# Patient Record
Sex: FEMALE | Race: WHITE | NOT HISPANIC OR LATINO
[De-identification: names, ages, dates, MRNs, and addresses within clinical notes are randomized per-mention and may not be internally consistent; named-entity substitution may affect disease eponyms.]

---

## 2017-02-16 ENCOUNTER — APPOINTMENT (OUTPATIENT)
Dept: UROLOGY | Facility: CLINIC | Age: 67
End: 2017-02-16

## 2017-02-16 VITALS — WEIGHT: 290 LBS | BODY MASS INDEX: 41.52 KG/M2 | TEMPERATURE: 98.2 F | HEIGHT: 70 IN | HEART RATE: 70 BPM

## 2017-02-16 DIAGNOSIS — E11.9 TYPE 2 DIABETES MELLITUS W/OUT COMPLICATIONS: ICD-10-CM

## 2017-02-16 DIAGNOSIS — R39.198 OTHER DIFFICULTIES WITH MICTURITION: ICD-10-CM

## 2017-02-27 ENCOUNTER — OUTPATIENT (OUTPATIENT)
Dept: OUTPATIENT SERVICES | Facility: HOSPITAL | Age: 67
LOS: 1 days | End: 2017-02-27

## 2017-03-17 ENCOUNTER — OUTPATIENT (OUTPATIENT)
Dept: OUTPATIENT SERVICES | Facility: HOSPITAL | Age: 67
LOS: 1 days | End: 2017-03-17

## 2017-03-17 ENCOUNTER — INPATIENT (INPATIENT)
Facility: HOSPITAL | Age: 67
LOS: 0 days | Discharge: HOME | End: 2017-03-18
Payer: MEDICARE

## 2017-03-17 PROCEDURE — 73503 X-RAY EXAM HIP UNI 4/> VIEWS: CPT | Mod: 26,LT

## 2017-03-18 ENCOUNTER — INPATIENT (INPATIENT)
Facility: HOSPITAL | Age: 67
LOS: 13 days | Discharge: HOME | End: 2017-04-01
Payer: MEDICARE

## 2017-03-18 ENCOUNTER — OUTPATIENT (OUTPATIENT)
Dept: OUTPATIENT SERVICES | Facility: HOSPITAL | Age: 67
LOS: 1 days | End: 2017-03-18

## 2017-03-21 PROCEDURE — 73503 X-RAY EXAM HIP UNI 4/> VIEWS: CPT | Mod: 26,LT

## 2017-03-23 PROCEDURE — 72170 X-RAY EXAM OF PELVIS: CPT | Mod: 26

## 2017-04-25 ENCOUNTER — APPOINTMENT (OUTPATIENT)
Dept: UROLOGY | Facility: CLINIC | Age: 67
End: 2017-04-25

## 2017-04-25 DIAGNOSIS — N36.42 INTRINSIC SPHINCTER DEFICIENCY (ISD): ICD-10-CM

## 2017-04-25 RX ORDER — LEVOTHYROXINE SODIUM 75 UG/1
75 TABLET ORAL
Qty: 90 | Refills: 0 | Status: ACTIVE | COMMUNITY
Start: 2017-03-08

## 2017-04-25 RX ORDER — NYSTATIN 100000 [USP'U]/G
100000 POWDER TOPICAL
Qty: 15 | Refills: 0 | Status: ACTIVE | COMMUNITY
Start: 2017-04-01

## 2017-04-25 RX ORDER — METFORMIN HYDROCHLORIDE 500 MG/1
500 TABLET, COATED ORAL
Qty: 360 | Refills: 0 | Status: ACTIVE | COMMUNITY
Start: 2017-03-27

## 2017-04-25 RX ORDER — LOVASTATIN 20 MG/1
20 TABLET ORAL
Qty: 90 | Refills: 0 | Status: ACTIVE | COMMUNITY
Start: 2017-03-08

## 2017-05-18 ENCOUNTER — APPOINTMENT (OUTPATIENT)
Dept: UROLOGY | Facility: CLINIC | Age: 67
End: 2017-05-18

## 2017-06-01 ENCOUNTER — APPOINTMENT (OUTPATIENT)
Dept: UROLOGY | Facility: CLINIC | Age: 67
End: 2017-06-01

## 2017-06-01 VITALS
HEIGHT: 70 IN | HEART RATE: 71 BPM | BODY MASS INDEX: 41.52 KG/M2 | WEIGHT: 290 LBS | RESPIRATION RATE: 13 BRPM | TEMPERATURE: 97.9 F

## 2017-06-01 RX ORDER — OXYCODONE 5 MG/1
5 TABLET ORAL
Qty: 30 | Refills: 0 | Status: DISCONTINUED | COMMUNITY
Start: 2017-04-01 | End: 2017-06-01

## 2017-06-01 RX ORDER — FERROUS FUM/VIT C/B12-IF/FOLIC 110-0.5MG
CAPSULE ORAL
Qty: 60 | Refills: 0 | Status: DISCONTINUED | COMMUNITY
Start: 2017-04-01 | End: 2017-06-01

## 2017-06-01 RX ORDER — CELECOXIB 200 MG/1
200 CAPSULE ORAL
Qty: 60 | Refills: 0 | Status: DISCONTINUED | COMMUNITY
Start: 2017-03-31 | End: 2017-06-01

## 2017-06-01 RX ORDER — SOLIFENACIN SUCCINATE 10 MG/1
10 TABLET, FILM COATED ORAL DAILY
Qty: 90 | Refills: 3 | Status: ACTIVE | COMMUNITY
Start: 2017-06-01 | End: 1900-01-01

## 2017-06-01 RX ORDER — AMOXICILLIN 500 MG/1
500 CAPSULE ORAL
Qty: 20 | Refills: 0 | Status: COMPLETED | COMMUNITY
Start: 2017-04-14 | End: 2017-06-01

## 2017-06-01 RX ORDER — PRAVASTATIN SODIUM 20 MG/1
20 TABLET ORAL
Qty: 30 | Refills: 0 | Status: DISCONTINUED | COMMUNITY
Start: 2017-04-01 | End: 2017-06-01

## 2017-06-01 RX ORDER — FAMOTIDINE 20 MG/1
20 TABLET, FILM COATED ORAL
Qty: 60 | Refills: 0 | Status: DISCONTINUED | COMMUNITY
Start: 2017-04-01 | End: 2017-06-01

## 2017-06-01 RX ORDER — OXYBUTYNIN CHLORIDE 10 MG/1
10 TABLET, EXTENDED RELEASE ORAL
Qty: 30 | Refills: 0 | Status: DISCONTINUED | COMMUNITY
Start: 2017-04-01 | End: 2017-06-01

## 2017-06-05 LAB
APPEARANCE: ABNORMAL
BACTERIA UR CULT: NORMAL
BACTERIA: ABNORMAL
BILIRUBIN URINE: NEGATIVE
BLOOD URINE: NEGATIVE
COLOR: ABNORMAL
GLUCOSE QUALITATIVE U: NORMAL MG/DL
HYALINE CASTS: 0 /LPF
KETONES URINE: NEGATIVE
LEUKOCYTE ESTERASE URINE: NEGATIVE
MICROSCOPIC-UA: NORMAL
NITRITE URINE: NEGATIVE
PH URINE: 8.5
PROTEIN URINE: NEGATIVE MG/DL
RED BLOOD CELLS URINE: 3 /HPF
SPECIFIC GRAVITY URINE: 1.02
SQUAMOUS EPITHELIAL CELLS: 5 /HPF
UROBILINOGEN URINE: NORMAL MG/DL
WHITE BLOOD CELLS URINE: 3 /HPF

## 2022-01-27 ENCOUNTER — NON-APPOINTMENT (OUTPATIENT)
Age: 72
End: 2022-01-27

## 2022-01-27 ENCOUNTER — APPOINTMENT (OUTPATIENT)
Dept: OPHTHALMOLOGY | Facility: CLINIC | Age: 72
End: 2022-01-27
Payer: MEDICARE

## 2022-01-27 PROCEDURE — 92133 CPTRZD OPH DX IMG PST SGM ON: CPT

## 2022-01-27 PROCEDURE — 92020 GONIOSCOPY: CPT

## 2022-01-27 PROCEDURE — 92014 COMPRE OPH EXAM EST PT 1/>: CPT

## 2022-01-31 ENCOUNTER — APPOINTMENT (OUTPATIENT)
Dept: ORTHOPEDIC SURGERY | Facility: CLINIC | Age: 72
End: 2022-01-31
Payer: MEDICARE

## 2022-02-07 ENCOUNTER — NON-APPOINTMENT (OUTPATIENT)
Age: 72
End: 2022-02-07

## 2022-02-07 ENCOUNTER — APPOINTMENT (OUTPATIENT)
Dept: OPHTHALMOLOGY | Facility: CLINIC | Age: 72
End: 2022-02-07
Payer: MEDICARE

## 2022-02-07 PROCEDURE — 92012 INTRM OPH EXAM EST PATIENT: CPT

## 2022-02-14 ENCOUNTER — APPOINTMENT (OUTPATIENT)
Dept: ORTHOPEDIC SURGERY | Facility: CLINIC | Age: 72
End: 2022-02-14
Payer: MEDICARE

## 2022-02-14 VITALS — TEMPERATURE: 97.6 F | WEIGHT: 290 LBS | BODY MASS INDEX: 41.52 KG/M2 | HEIGHT: 70 IN

## 2022-02-14 DIAGNOSIS — Z82.49 FAMILY HISTORY OF ISCHEMIC HEART DISEASE AND OTHER DISEASES OF THE CIRCULATORY SYSTEM: ICD-10-CM

## 2022-02-14 DIAGNOSIS — M18.12 UNILATERAL PRIMARY OSTEOARTHRITIS OF FIRST CARPOMETACARPAL JOINT, LEFT HAND: ICD-10-CM

## 2022-02-14 DIAGNOSIS — Z78.9 OTHER SPECIFIED HEALTH STATUS: ICD-10-CM

## 2022-02-14 DIAGNOSIS — M67.442 GANGLION, LEFT HAND: ICD-10-CM

## 2022-02-14 DIAGNOSIS — Z78.0 ASYMPTOMATIC MENOPAUSAL STATE: ICD-10-CM

## 2022-02-14 DIAGNOSIS — Z86.39 PERSONAL HISTORY OF OTHER ENDOCRINE, NUTRITIONAL AND METABOLIC DISEASE: ICD-10-CM

## 2022-02-14 DIAGNOSIS — Z84.1 FAMILY HISTORY OF DISORDERS OF KIDNEY AND URETER: ICD-10-CM

## 2022-02-14 DIAGNOSIS — Z83.3 FAMILY HISTORY OF DIABETES MELLITUS: ICD-10-CM

## 2022-02-14 DIAGNOSIS — M18.11 UNILATERAL PRIMARY OSTEOARTHRITIS OF FIRST CARPOMETACARPAL JOINT, RIGHT HAND: ICD-10-CM

## 2022-02-14 DIAGNOSIS — Z87.39 PERSONAL HISTORY OF OTHER DISEASES OF THE MUSCULOSKELETAL SYSTEM AND CONNECTIVE TISSUE: ICD-10-CM

## 2022-02-14 DIAGNOSIS — Z87.2 PERSONAL HISTORY OF DISEASES OF THE SKIN AND SUBCUTANEOUS TISSUE: ICD-10-CM

## 2022-02-14 PROCEDURE — 76881 US COMPL JOINT R-T W/IMG: CPT | Mod: LT

## 2022-02-14 PROCEDURE — 99204 OFFICE O/P NEW MOD 45 MIN: CPT | Mod: 25

## 2022-02-14 PROCEDURE — 73130 X-RAY EXAM OF HAND: CPT | Mod: 50

## 2022-02-14 RX ORDER — MUPIROCIN 20 MG/G
2 OINTMENT TOPICAL
Qty: 22 | Refills: 0 | Status: ACTIVE | COMMUNITY
Start: 2021-12-15

## 2022-02-14 RX ORDER — DESMOPRESSIN ACETATE 0.2 MG/1
0.2 TABLET ORAL
Qty: 90 | Refills: 0 | Status: ACTIVE | COMMUNITY
Start: 2021-12-13

## 2022-02-14 RX ORDER — TRIAMCINOLONE ACETONIDE 1 MG/G
0.1 CREAM TOPICAL
Qty: 80 | Refills: 0 | Status: ACTIVE | COMMUNITY
Start: 2021-12-17

## 2022-02-14 RX ORDER — ATORVASTATIN CALCIUM 20 MG/1
20 TABLET, FILM COATED ORAL
Qty: 90 | Refills: 0 | Status: ACTIVE | COMMUNITY
Start: 2021-12-15

## 2022-02-14 NOTE — PHYSICAL EXAM
[de-identified] : - Constitutional: This is a female in no obvious distress.  \par - Psych: Patient is alert and oriented to person, place and time.  Patient has a normal mood and affect.\par - Cardiovascular: Normal pulses throughout the upper extremities.  No significant varicosities are noted in the upper extremities. \par - Neuro: Strength and sensation are intact throughout the upper extremities.  Patient has normal coordination.\par - Respiratory:  Patient exhibits no evidence of shortness of breath or difficulty breathing.\par - Skin: No rashes, lesions, or other abnormalities are noted in the upper extremities.\par \par ---\par \par Examination both thumbs demonstrate swelling and tenderness along the CMC joints consistent with CMC joint arthritis.  There is no evidence of de Quervain's tendinitis or trigger thumb.  She is neurovascular intact distally.\par \par Examination of her left middle finger demonstrates a small palpable mass along the radial aspect of the DIP joint.  There is some tenderness.  This appears either consistent with a ganglion cyst emanating from the DIP joint radially or possibly other benign etiologies.  She has full motion.  She is neurovascularly intact distally. [de-identified] : AP, lateral, and oblique radiographs of the bilateral hands demonstrate bilateral CMC joint arthritis of the thumbs.  There is no evidence of arthritis or abnormalities at the left middle finger DIP joint.\par \par A diagnostic ultrasound of her left middle finger demonstrates a possible cyst or other benign lesion overlying the DIP joint radially.

## 2022-02-14 NOTE — HISTORY OF PRESENT ILLNESS
[Right] : right hand dominant [FreeTextEntry1] : She comes in today for evaluation of bilateral thumb pain for several years and left middle finger for 2 months. With regard to the left middle finger, she states that pain was sudden. She rates her pain a 8 out of 10 when she either bumps or uses her finger. With regard to the bilateral thumb, She states that the pain had a gradual onset. She thinks that it maybe arthritis. She states that she has no history of injury or trauma for either hands or fingers. She denies having imaging and testing done.\par \par She has a past medical history which includes type 2 diabetes.\par \par She used to see Dr. Garcia. She was not referred.

## 2022-02-14 NOTE — END OF VISIT
[FreeTextEntry3] : This note was written by Je Hansen on 02/14/2022 acting solely as a scribe for Dr. Jose Hunt.\par  \par All medical record entries made by the Scribe were at my, Dr. Jose Hunt, direction and personally dictated by me on 02/14/2022. I have personally reviewed the chart and agree that the record accurately reflects my personal performance of the history, physical exam.

## 2022-02-14 NOTE — DISCUSSION/SUMMARY
[FreeTextEntry1] : She has findings consistent with a probable left middle finger DIP joint ganglion cyst which is causing her mild symptoms as well as chronic mild bilateral thumb pain secondary to CMC joint arthritis.\par \par I had a discussion with the patient regarding today's visit, the prognosis of this diagnosis, and treatment recommendations and options.\par \par With regard to the left middle finger probable cyst, as her symptoms are relatively mild, I have recommended observation.  I explained to her that this is not amenable to aspiration, as it is overlying the neurovascular bundle.  The only option would be surgical excision.  At this time she has agreed to proceed with observation.  If it increases in size or she develops worsening symptoms, then she will follow-up to discuss surgical excision.\par \par With regard to the bilateral thumb pain, as her symptoms are relatively mild, she deferred a cortisone injection today.\par \par The patient has agreed to the above plan of management and has expressed full understanding.  All questions were fully answered to the patient's satisfaction. \par \par My cumulative time spent on this visit included: Preparation for the visit, review of the medical records, review of pertinent diagnostic studies, examination and counseling of the patient on the above diagnosis, treatment plan and prognosis, orders of diagnostic tests, medication and/or appropriate procedures and documentation in the medical records of today's visit.

## 2022-10-27 ENCOUNTER — APPOINTMENT (OUTPATIENT)
Dept: OPHTHALMOLOGY | Facility: CLINIC | Age: 72
End: 2022-10-27

## 2022-10-27 ENCOUNTER — NON-APPOINTMENT (OUTPATIENT)
Age: 72
End: 2022-10-27

## 2022-10-27 PROCEDURE — 92014 COMPRE OPH EXAM EST PT 1/>: CPT

## 2022-10-27 PROCEDURE — 92020 GONIOSCOPY: CPT

## 2022-10-27 PROCEDURE — 92133 CPTRZD OPH DX IMG PST SGM ON: CPT

## 2022-12-06 ENCOUNTER — APPOINTMENT (OUTPATIENT)
Dept: UROGYNECOLOGY | Facility: CLINIC | Age: 72
End: 2022-12-06

## 2022-12-06 VITALS
SYSTOLIC BLOOD PRESSURE: 179 MMHG | BODY MASS INDEX: 40.09 KG/M2 | WEIGHT: 280 LBS | DIASTOLIC BLOOD PRESSURE: 90 MMHG | HEIGHT: 70 IN

## 2022-12-06 PROCEDURE — 99204 OFFICE O/P NEW MOD 45 MIN: CPT

## 2022-12-06 NOTE — HISTORY OF PRESENT ILLNESS
[FreeTextEntry1] : Patient is a 72-year-old G0 who presents today for evaluation and management of urinary frequency urgency and incontinence\par Patient states that I am tired of getting up 3-4 times at night for past 15-18 years. She thinks its getting worse. She states that the daytime is not as bad but still gets sudden strong urge to void and starts leaking urine. She goes through 6 poise pad per day. She is concerned about the cost of these products. \par She leaks rarely with cough/ vomit\par Daily fluid intake: 2 cups of coffee, 1 glass of ice tea, 20 oz of soda, some water\par Patient saw Dr. Sarai Zamarripa in 2016 and urogynecologist Dr. Serrano before that.  She had urodynamic testing with Dr. Zamarripa in February 2017 which showed normal sensation, normal cystometric capacity of 500 cc, absence of detrusor overactivity and absence of stress urinary incontinence.  There was no evidence of voiding dysfunction. Patient had coapt tight injected in April 2017 for ISD.  She has tried Vesicare.   She is currently taking 10 mg of Vesicare.She is also taking Desmopressin per Dr. Zamarripa\par Vaginal symptoms: denies vaginal pain, not \par \par GYN Hx: LMP: around age 57, denies hx of postmenopausal bleeding.  She reports having a Pap smear done few weeks ago by Dr. Blair.  She denies history of abnormal mammograms\par \par Past medical history: Diabetes (reports recent hemoglobin A1c of 5.7), hyperlipidemia, hypothyroidism, obesity \par \par Past surgical history: Hip replacement

## 2022-12-06 NOTE — ASSESSMENT
[FreeTextEntry1] : Patient is a 72-year-old nulligravida presenting with long history nocturia, overactive bladder, of with mixed urinary incontinence (urgency greater than stress). On examination, there is no evidence of urethral hypermobility with a negative empty bladder supine cough stress test, normal postvoid residual, and clear levator ani awareness/contraction. She has no history of recurrent UTI. She admits to consumption of large volumes of bladder irritants. Other potential contributing factors include with obesity, diabetes and decreased mobility.  She is taking Vesicare 10 mg as well as desmopressin and does not report satisfactory improvement.  She has previously seen Dr. Zamarripa in 2016 and had urethral bulking done.  Her urodynamic testing from 2016 was negative for evidence of detrusor overactivity or stress urinary incontinence.\par \par

## 2022-12-06 NOTE — PHYSICAL EXAM
[Chaperone Present] : A chaperone was present in the examining room during all aspects of the physical examination [FreeTextEntry1] : General: Not in acute distress, alert and oriented x3.\par Neck: Supple. No lymphadenopathy. \par Abdomen: Soft, nontender, and nondistended. No obvious hepatosplenomegaly. No obvious hernias. \par Pelvic Exam: Normal external female genitalia. Saddle sensory exam S2 to S4 is intact. Perineal reflexes not visualized. Urethra is well supported without prolapse, exudates, or lesions. Cough stress test is negative. Post void residual was checked with I/O cath and was 40 cc of clear urine. Pale and mildly atrophic-appearing vaginal epithelium. No vaginal blood or discharge. Cervix without abnormal lesions. Bimanual exam reveals a small uterus in normal positioning. No palpable adnexal masses or tenderness. Levator ani contraction is 2/5.  All vaginal compartments are well supported\par \par

## 2022-12-06 NOTE — DISCUSSION/SUMMARY
[FreeTextEntry1] : The patient was counseled regarding the pathophysiology of the above conditions. A total of approximately 60 minutes was spent on this visit, greater than 50%of which was spent on counseling. She was also counseled regarding the risks, benefits, indications, and alternatives of further evaluations studies, as well as various management options. She was given verbal and written information/education on pelvic floor muscle exercises, avoidance of dietary bladder irritants, and other strategies to improve bladder control.  management of overactive bladder and urgency incontinence including medications, behavioral modification, physical therapy, Botox and sacral neuromodulation were discussed. After a detailed discussion, following management plan was outlined:\par 1.  Patient is consuming large volumes of bladder irritants.  Recommended 12 weeks trial of behavioral modification, bladder retraining and pelvic floor muscle therapy. she will start the home exercise program as instructed.\par 2. UA with micro and urine culture was ordered to exclude UTI.\par 3.  She has multiple risk factors for sleep apnea she endorses daytime somnolence and fatigue as well as poor quality of sleep.  I strongly recommended screening for sleep apnea.  She agrees.  Referral for sleep medicine clinic was provided.  Night time fluid restriction and other strategies to decrease nocturia were reviewed.\par 4. Discussed importance of maintaining optimal glycemic control in for bladder health.\par 5.  I also reviewed tapering herself off the desmopressin if its not helping.  She is going to contact her endocrinologist who is also her primary care provider regarding discontinuation of desmopressin. \par 6.  F/u in 2 to 3 months.  If she hasn't felt satisfactory improvement with above, will consider further evaluation with urodynamic testing. .

## 2022-12-07 LAB
APPEARANCE: CLEAR
BACTERIA: NEGATIVE
BILIRUBIN URINE: NEGATIVE
BLOOD URINE: NEGATIVE
COLOR: NORMAL
GLUCOSE QUALITATIVE U: NEGATIVE
HYALINE CASTS: 1 /LPF
KETONES URINE: NEGATIVE
LEUKOCYTE ESTERASE URINE: NEGATIVE
MICROSCOPIC-UA: NORMAL
NITRITE URINE: NEGATIVE
PH URINE: 6.5
PROTEIN URINE: NEGATIVE
RED BLOOD CELLS URINE: 1 /HPF
SPECIFIC GRAVITY URINE: 1.01
SQUAMOUS EPITHELIAL CELLS: 1 /HPF
UROBILINOGEN URINE: NORMAL
WHITE BLOOD CELLS URINE: 0 /HPF

## 2022-12-08 LAB — BACTERIA UR CULT: NORMAL

## 2023-02-17 ENCOUNTER — APPOINTMENT (OUTPATIENT)
Dept: UROGYNECOLOGY | Facility: CLINIC | Age: 73
End: 2023-02-17

## 2023-12-06 ENCOUNTER — RESULT CHARGE (OUTPATIENT)
Age: 73
End: 2023-12-06

## 2023-12-07 ENCOUNTER — APPOINTMENT (OUTPATIENT)
Dept: UROGYNECOLOGY | Facility: CLINIC | Age: 73
End: 2023-12-07
Payer: MEDICARE

## 2023-12-07 DIAGNOSIS — R06.83 SNORING: ICD-10-CM

## 2023-12-07 PROCEDURE — 81003 URINALYSIS AUTO W/O SCOPE: CPT | Mod: QW

## 2023-12-07 PROCEDURE — 99214 OFFICE O/P EST MOD 30 MIN: CPT

## 2023-12-07 RX ORDER — FLUCONAZOLE 150 MG/1
150 TABLET ORAL
Qty: 3 | Refills: 0 | Status: ACTIVE | COMMUNITY
Start: 2023-12-07 | End: 1900-01-01

## 2023-12-07 RX ORDER — SULFAMETHOXAZOLE AND TRIMETHOPRIM 800; 160 MG/1; MG/1
800-160 TABLET ORAL
Qty: 28 | Refills: 0 | Status: ACTIVE | COMMUNITY
Start: 2023-12-07 | End: 1900-01-01

## 2023-12-07 RX ORDER — SOLIFENACIN SUCCINATE 10 MG/1
10 TABLET ORAL
Qty: 90 | Refills: 1 | Status: ACTIVE | COMMUNITY
Start: 1900-01-01 | End: 1900-01-01

## 2023-12-07 RX ORDER — METRONIDAZOLE 500 MG/1
500 TABLET ORAL
Qty: 14 | Refills: 0 | Status: ACTIVE | COMMUNITY
Start: 2023-12-07 | End: 1900-01-01

## 2023-12-18 RX ORDER — AMOXICILLIN AND CLAVULANATE POTASSIUM 875; 125 MG/1; MG/1
875-125 TABLET, COATED ORAL TWICE DAILY
Qty: 14 | Refills: 0 | Status: ACTIVE | COMMUNITY
Start: 2023-12-18 | End: 1900-01-01

## 2023-12-18 RX ORDER — DOXYCYCLINE HYCLATE 100 MG/1
100 TABLET ORAL
Qty: 14 | Refills: 0 | Status: ACTIVE | COMMUNITY
Start: 2023-12-18 | End: 1900-01-01

## 2023-12-21 ENCOUNTER — APPOINTMENT (OUTPATIENT)
Dept: UROGYNECOLOGY | Facility: CLINIC | Age: 73
End: 2023-12-21
Payer: MEDICARE

## 2023-12-21 PROCEDURE — 99214 OFFICE O/P EST MOD 30 MIN: CPT

## 2023-12-27 ENCOUNTER — APPOINTMENT (OUTPATIENT)
Dept: UROGYNECOLOGY | Facility: CLINIC | Age: 73
End: 2023-12-27
Payer: MEDICARE

## 2023-12-27 PROCEDURE — 51741 ELECTRO-UROFLOWMETRY FIRST: CPT

## 2023-12-27 PROCEDURE — 51784 ANAL/URINARY MUSCLE STUDY: CPT

## 2023-12-27 PROCEDURE — 51797 INTRAABDOMINAL PRESSURE TEST: CPT

## 2023-12-27 PROCEDURE — 81003 URINALYSIS AUTO W/O SCOPE: CPT | Mod: QW

## 2023-12-27 PROCEDURE — 51729 CYSTOMETROGRAM W/VP&UP: CPT

## 2023-12-29 LAB
APPEARANCE: CLEAR
BACTERIA: NEGATIVE /HPF
BILIRUBIN URINE: NEGATIVE
BLOOD URINE: NEGATIVE
CAST: 1 /LPF
COLOR: YELLOW
EPITHELIAL CELLS: 1 /HPF
GLUCOSE QUALITATIVE U: NEGATIVE MG/DL
KETONES URINE: NEGATIVE MG/DL
LEUKOCYTE ESTERASE URINE: NEGATIVE
MICROSCOPIC-UA: NORMAL
NITRITE URINE: NEGATIVE
PH URINE: 6.5
PROTEIN URINE: NEGATIVE MG/DL
RED BLOOD CELLS URINE: 0 /HPF
SPECIFIC GRAVITY URINE: 1.02
UROBILINOGEN URINE: 0.2 MG/DL
WHITE BLOOD CELLS URINE: 0 /HPF

## 2024-01-01 NOTE — HISTORY OF PRESENT ILLNESS
[FreeTextEntry1] : Patient is a 72-year-old nulligravida with obesity and suspected sleep apnea, who is presenting with long history nocturia, overactive bladder,  mixed urinary incontinence (urgency greater than stress) and cellulitis of the upper media right thigh.  She has tried Vesicare with suboptimal improvement in her symptoms.  Patient presented for urodynamic testing on 12/27/2023.  Her urodynamic test findings are consistent with an interrupted uroflow with voided volume of 370 cc, postvoid residual of 40 cc, max flow rate of 31, average flow rate of 12, and flow time of 31 seconds, her complex cystogram showed normal sensation, normal compliance, normal cystometric capacity of 488 cc, absence of detrusor overactivity during filling and presence of stress urinary incontinence starting at filled volume of 200 cc with Valsalva and with cough starting at filled volume of 300 cc; she voided 560 cc for pressure voiding study with max flow rate of 34, average flow rate of 12, flow time of 46 seconds, detrusor pressure at peak flow of 13 cm of water, normal continuous configuration and Valsalva appearing to be the mechanism of void.

## 2024-01-01 NOTE — DISCUSSION/SUMMARY
[FreeTextEntry1] : I discussed the urodynamic procedure finding in detail.  I again discussed the diagnosis of overactive bladder, urgency incontinence as well as stress incontinence and explained to her that she has a combination of these conditions and treatment for one may not address the other condition. She was again counseled regarding treatment options for stress urinary incontinence including pelvic floor PT, pessary placement and surgical management with midurethral sling. AUGS interactive tool was used to explain normal anatomy as well as alteration in urethral support associated with stress urinary incontinence. We also reviewed treatment options for urinary frequency and nocturia including bladder retraining, Pharmacologic management, Botox etc. We discussed addressing OAB symptoms first vs doing sling procedure for stress incontinence first. After a detailed discussion, patient strongly believes that she wants to address her stress incontinence first and wants to proceed with sling placement. She is comfortable with the possibly of requiring additional treatment for her OAB symptoms and urgency incontinence if needed. She understands that the midurethral sling is meant to address her stress type urinary incontinence and may not improve urgency incontinence. I discussed the potential risks and complications associated with midurethral sling procedure including changes in urinary stream including weak stream, need to spend additional time emptying bladder, voiding dysfunction, temporary bladder catheterization, recurrent urinary tract infection, mesh related complication, pelvic pain/ dyspareunia, injury to bladder/ urethra and need for future surgery etc. I also reviewed her undocumented history of frequent UTI and potential worsening of UTI frequency following sling procedure. She verbalized understanding. I provided her written information regarding the midurethral sling procedure.

## 2024-01-01 NOTE — ASSESSMENT
[FreeTextEntry1] :  Above urodynamic findings are consistent with normal sensation, normal compliance, normal cystometric capacity, absence of detrusor overactivity, presence of stress urinary incontinence and absence of voiding dysfunction.  Patient appears to be a reasonable candidate for retropubic mid mid urethral sling placement from urodynamic criteria however she has multiple medical comorbidities which need to be optimized prior to proceeding with the surgical procedure.  Her cellulitis of the right thigh is improving.

## 2024-01-02 LAB — BACTERIA UR CULT: NORMAL

## 2024-01-04 ENCOUNTER — APPOINTMENT (OUTPATIENT)
Dept: OPHTHALMOLOGY | Facility: CLINIC | Age: 74
End: 2024-01-04
Payer: MEDICARE

## 2024-01-04 ENCOUNTER — NON-APPOINTMENT (OUTPATIENT)
Age: 74
End: 2024-01-04

## 2024-01-04 PROCEDURE — 92133 CPTRZD OPH DX IMG PST SGM ON: CPT

## 2024-01-04 PROCEDURE — 92020 GONIOSCOPY: CPT

## 2024-01-04 PROCEDURE — 92014 COMPRE OPH EXAM EST PT 1/>: CPT

## 2024-01-06 NOTE — DISCUSSION/SUMMARY
[FreeTextEntry1] : [] Recommend finishing Doxycycline 100mg PO BID X7day and Augmentin PO BID x7days. [] Pt is scheduled for UDS tests on 12/27/23, advised her to discontinue Vesicare until she is done with test. Provided her UDS Brochure for education and gave her instructions to not to empty her bladder before test. Pt verbalized understanding. [] Recommend using Zinc Oxide in groin area for protection of skin from breakage.  [] Follow up with endocrinologist for better control of diabetes and weight loss. [] Follow up with sleep medicine doctor to get CPAP machine as soon as possible. [] Recommend seeing gynecologist and going for US of breast and mammogram. [] Contact [facility] after UDs testing if feel ready for Axonics procedure. [] Follow up after UDS test. [] Instructed to call with any questions or concerns and she verbalizes understanding..

## 2024-01-06 NOTE — ADDENDUM
[FreeTextEntry1] : I, Dr. Amanda Tatum, was physically present in the office during the service provided to this patient.  I reviewed the history of presenting illness, chief complaint, management plan with physician assistant Vee Beltran before it was communicated to the patient.  I have personally examined the patient and evaluated the and the status of her groin abscess.  Overall she is improving.  I agree with the assessment and plan as documented by PA

## 2024-01-06 NOTE — ASSESSMENT
[FreeTextEntry1] : Patient is a 72-year-old nulligravida presenting with long history nocturia, overactive bladder, of with mixed urinary incontinence (urgency greater than stress). Pt is currently taking antibiotic for groin cellulitis with improvement. Her OAB symptoms are not controlled with Vesicare 10mg and she wants to proceed with Axonics procedure however she noticed a lump in her breast a week ago and wants to take care of it first. Pt has scheduled appointment for UDS on 12/27/23.

## 2024-01-06 NOTE — PHYSICAL EXAM
[FreeTextEntry1] : General: Not in acute distress, alert and oriented x3 Abdomen: Soft, nontender, and nondistended. Ecchymosis due to recent heparin injections. Pelvic Exam: Superomedial thigh cellulitis is all healed. Deep lump is palpable that will resolve with time. No erythema or discharge noted.

## 2024-01-06 NOTE — HISTORY OF PRESENT ILLNESS
[FreeTextEntry1] : Patient is a 72-year-old nulligravida presenting with long history nocturia, overactive bladder, and mixed urinary incontinence (urgency greater than stress). Pt was started on Bactrim, Metronidazole and Fluconazole on 12/07/23 for cellulitis of superomedial compartment of thigh. Pt called on 12/18/23 for having allergic reaction to Bactrim so her antibiotics were changed to Doxycycline and Augmentin x7day. Pt notes improvement in cellulitis, no pain and swelling any more. Pt reports currently taking both antibiotics without any complaint. Pt reports noticing new lump in her breast/sternum area that she is really worried and going to see gynecologist today.  Pt reports taking Vesicare 10mg with minimal improvement and reports waking up 3-4 times at night and wears pads all the time. Pt reports seeing her sleep medicine doctor next week for CPAP machine. Pt reports [facility] contacted her for SNM procedure, but she was not in right state of mind due to her breast lump and told her that she will contact her once done taking care of lump. Pt reports she is coming for UDS testing this coming Wednesday. Pt complained of being tired and not able to sleep all night.

## 2024-01-12 ENCOUNTER — APPOINTMENT (OUTPATIENT)
Dept: UROGYNECOLOGY | Facility: CLINIC | Age: 74
End: 2024-01-12
Payer: MEDICARE

## 2024-01-12 ENCOUNTER — NON-APPOINTMENT (OUTPATIENT)
Age: 74
End: 2024-01-12

## 2024-01-12 VITALS
DIASTOLIC BLOOD PRESSURE: 92 MMHG | SYSTOLIC BLOOD PRESSURE: 169 MMHG | WEIGHT: 280 LBS | BODY MASS INDEX: 40.09 KG/M2 | HEIGHT: 70 IN

## 2024-01-12 DIAGNOSIS — L03.315 CELLULITIS OF PERINEUM: ICD-10-CM

## 2024-01-12 PROCEDURE — 99214 OFFICE O/P EST MOD 30 MIN: CPT

## 2024-01-12 NOTE — ASSESSMENT
[FreeTextEntry1] : Above the above test findings are consistent with normal sensation, normal compliance, normal cystometric capacity, absence of detrusor overactivity, presence of stress urinary incontinence and absence of voiding dysfunction. Patient appears to be a reasonable candidate for retropubic mid mid urethral sling placement from urodynamic criteria however she has multiple medical comorbidities which need to be optimized prior to proceeding with the surgical procedure.

## 2024-01-12 NOTE — DISCUSSION/SUMMARY
[FreeTextEntry1] : I discussed the urodynamic test findings in detail with the patient.  She has a stopped using Vesicare 5 days before the test.  She mostly describes urgency symptoms however her most bothersome symptom is nocturia which is likely related to sleep apnea.  She is seeing the sleep apnea physician and is waiting for the CPAP machine to arrive..  I discussed the findings of stress urinary incontinence during urodynamic testing discussed management options for stress urinary incontinence including pessary and retropubic mid urethral sling placement.  The efficacy of the sling procedure, outpatient nature of the procedure and potential complications were reviewed with the patient.  After detailed discussion, she would like to start with a trial of incontinence ring with support pessary.  She will return in 2 to 3 weeks for pessary fitting. I again discussed the importance of weight loss.  Her hemoglobin A1c is well-controlled.  She is hesitant to consider Ozempic due to due to potential side effects.  I encouraged her to consider a consultation with dietitian to come up with individualized nutrition plan.  She is motivated to do so. Urine culture sent today to exclude urinary tract infection.  If there is evidence of urinary tract infection, will recommend antibiotic such as Keflex 500 mg p.o. twice daily for 7 days Continue Vesicare 10 mg p.o. daily.  Advised her to take the medication in the evening.

## 2024-01-12 NOTE — PHYSICAL EXAM
[FreeTextEntry1] : General: Not in acute distress, alert and oriented x3. Pelvic Exam: Normal external female genitalia.  Previously noted cellulitis of the right thigh has completely resolved.  No skin erythema, no induration etc. noted.  She has a small skin tag on right thigh which she was worried about and had dermatologist look at it. [No Acute Distress] : in no acute distress [Well developed] : well developed [Oriented x3] : oriented to person, place, and time

## 2024-01-16 LAB
APPEARANCE: ABNORMAL
BACTERIA: NEGATIVE /HPF
BILIRUBIN URINE: NEGATIVE
BLOOD URINE: NEGATIVE
CALCIUM OXALATE CRYSTALS: PRESENT
CAST: 0 /LPF
COLOR: YELLOW
EPITHELIAL CELLS: 3 /HPF
GLUCOSE QUALITATIVE U: NEGATIVE MG/DL
KETONES URINE: NEGATIVE MG/DL
LEUKOCYTE ESTERASE URINE: NEGATIVE
MICROSCOPIC-UA: NORMAL
NITRITE URINE: NEGATIVE
PH URINE: 6.5
PROTEIN URINE: NEGATIVE MG/DL
RED BLOOD CELLS URINE: NORMAL /HPF
REVIEW: NORMAL
SPECIFIC GRAVITY URINE: 1.02
UROBILINOGEN URINE: 0.2 MG/DL
WHITE BLOOD CELLS URINE: 0 /HPF

## 2024-01-17 LAB — BACTERIA UR CULT: NORMAL

## 2024-01-25 ENCOUNTER — APPOINTMENT (OUTPATIENT)
Dept: UROGYNECOLOGY | Facility: CLINIC | Age: 74
End: 2024-01-25

## 2024-02-15 ENCOUNTER — APPOINTMENT (OUTPATIENT)
Dept: UROGYNECOLOGY | Facility: CLINIC | Age: 74
End: 2024-02-15
Payer: MEDICARE

## 2024-02-15 PROCEDURE — 57160 INSERT PESSARY/OTHER DEVICE: CPT

## 2024-02-15 PROCEDURE — A4562: CPT

## 2024-02-17 LAB — BACTERIA UR CULT: NORMAL

## 2024-02-27 ENCOUNTER — APPOINTMENT (OUTPATIENT)
Dept: UROGYNECOLOGY | Facility: CLINIC | Age: 74
End: 2024-02-27
Payer: MEDICARE

## 2024-02-27 PROCEDURE — 99213 OFFICE O/P EST LOW 20 MIN: CPT

## 2024-02-28 LAB
APPEARANCE: ABNORMAL
BACTERIA: NEGATIVE /HPF
BILIRUBIN URINE: NEGATIVE
BLOOD URINE: NEGATIVE
CAST: 0 /LPF
COLOR: YELLOW
EPITHELIAL CELLS: 7 /HPF
GLUCOSE QUALITATIVE U: NEGATIVE MG/DL
KETONES URINE: NEGATIVE MG/DL
LEUKOCYTE ESTERASE URINE: ABNORMAL
MICROSCOPIC-UA: NORMAL
NITRITE URINE: NEGATIVE
PH URINE: 6.5
PROTEIN URINE: NEGATIVE MG/DL
RED BLOOD CELLS URINE: 5 /HPF
REVIEW: NORMAL
SPECIFIC GRAVITY URINE: 1.01
TRANSITIONAL EPITHELIAL CELLS: PRESENT
UROBILINOGEN URINE: 0.2 MG/DL
WHITE BLOOD CELLS URINE: 36 /HPF

## 2024-02-29 ENCOUNTER — NON-APPOINTMENT (OUTPATIENT)
Age: 74
End: 2024-02-29

## 2024-02-29 LAB — BACTERIA UR CULT: NORMAL

## 2024-04-19 ENCOUNTER — APPOINTMENT (OUTPATIENT)
Dept: UROGYNECOLOGY | Facility: CLINIC | Age: 74
End: 2024-04-19
Payer: MEDICARE

## 2024-04-19 DIAGNOSIS — R35.1 NOCTURIA: ICD-10-CM

## 2024-04-19 DIAGNOSIS — B37.89 OTHER SITES OF CANDIDIASIS: ICD-10-CM

## 2024-04-19 PROCEDURE — 81003 URINALYSIS AUTO W/O SCOPE: CPT | Mod: QW

## 2024-04-19 PROCEDURE — 64561 IMPLANT NEUROELECTRODES: CPT | Mod: 50,59

## 2024-04-19 PROCEDURE — 99214 OFFICE O/P EST MOD 30 MIN: CPT | Mod: 25

## 2024-04-19 RX ORDER — FLUCONAZOLE 200 MG/1
200 TABLET ORAL
Qty: 3 | Refills: 0 | Status: ACTIVE | COMMUNITY
Start: 2024-04-19 | End: 1900-01-01

## 2024-04-22 LAB
APPEARANCE: CLEAR
BACTERIA UR CULT: NORMAL
BACTERIA: NEGATIVE /HPF
BILIRUBIN URINE: NEGATIVE
BLOOD URINE: NEGATIVE
CAST: 2 /LPF
COLOR: YELLOW
EPITHELIAL CELLS: 4 /HPF
GLUCOSE QUALITATIVE U: NEGATIVE MG/DL
KETONES URINE: NEGATIVE MG/DL
LEUKOCYTE ESTERASE URINE: NEGATIVE
MICROSCOPIC-UA: NORMAL
NITRITE URINE: NEGATIVE
PH URINE: 6
PROTEIN URINE: 30 MG/DL
RED BLOOD CELLS URINE: 1 /HPF
SPECIFIC GRAVITY URINE: 1.02
UROBILINOGEN URINE: 0.2 MG/DL
WHITE BLOOD CELLS URINE: 1 /HPF

## 2024-04-25 ENCOUNTER — APPOINTMENT (OUTPATIENT)
Dept: UROGYNECOLOGY | Facility: CLINIC | Age: 74
End: 2024-04-25
Payer: MEDICARE

## 2024-04-25 DIAGNOSIS — Z46.89 ENCOUNTER FOR FITTING AND ADJUSTMENT OF OTHER SPECIFIED DEVICES: ICD-10-CM

## 2024-04-25 DIAGNOSIS — R35.0 FREQUENCY OF MICTURITION: ICD-10-CM

## 2024-04-25 DIAGNOSIS — R39.15 URGENCY OF URINATION: ICD-10-CM

## 2024-04-25 DIAGNOSIS — N39.3 STRESS INCONTINENCE (FEMALE) (MALE): ICD-10-CM

## 2024-04-25 DIAGNOSIS — N89.8 OTHER SPECIFIED NONINFLAMMATORY DISORDERS OF VAGINA: ICD-10-CM

## 2024-04-25 DIAGNOSIS — N39.46 MIXED INCONTINENCE: ICD-10-CM

## 2024-04-25 DIAGNOSIS — R32 UNSPECIFIED URINARY INCONTINENCE: ICD-10-CM

## 2024-04-25 DIAGNOSIS — N39.41 URGE INCONTINENCE: ICD-10-CM

## 2024-04-25 DIAGNOSIS — N32.81 OVERACTIVE BLADDER: ICD-10-CM

## 2024-04-25 PROCEDURE — 99024 POSTOP FOLLOW-UP VISIT: CPT

## 2024-04-25 NOTE — PHYSICAL EXAM
[FreeTextEntry1] :  General: Not in acute distress, alert and oriented x3.Neck: Supple. No lymphadenopathy.  Abdomen: Soft, nontender, and nondistended. No obvious hepatosplenomegaly. No obvious hernias. Successfully back PNE leads were removed with colored marker tip. No evidence of lead breaking or fragments left behind.  Pelvic Exam: Normal external female genitalia. Urethra with a small polyp, its hypermobile without prolapse, exudates, or lesions. Cough stress test is negative. Anterior vaginal wall is well supported. Pessary was removed. No prolapse noted with and without pessary. At the right vaginal wall small laceration was noted w/o bleeding. Vaginal fourchette and top of urethral opening some laceration noted due to removal of pessary. Very scant bleeding.

## 2024-04-25 NOTE — ASSESSMENT
[FreeTextEntry1] : Anna after PNE noted great difference in her OAB symptoms however improvement lasted only one day. Still pt is agreeable to do advance trial. Also, pt's pessary was removed due to noticing no difference in SUZY and pain with removal and insertion of pessary.

## 2024-04-25 NOTE — HISTORY OF PRESENT ILLNESS
[FreeTextEntry1] : Patient is a 73-year-old nulligravida with obesity and suspected sleep apnea, who is presenting with long history nocturia, overactive bladder, and mixed urinary incontinence (urgency greater than stress) Pt was here for the leads removal. Pt had a PNE in the office on 04/19/24. Pt is very excited to share that she didn't notice any frequency and urgency the same day she had PNE. Reports, at night during sleep her leads might dislodged and symptoms started to appear next day. Reports noticing no difference in her symptoms after first day. She is interested in proceeding with full trial.   Pt also wants her pessary to be removed today. Pt reports she did not notice any difference in urine incontinence with pessary. Denies any pain with pessary/pressure and vaginal bleeding. Pt reports she cannot tolerate pain while removal and insertion every three months for pessary care. Denies urinary/UTI symptoms, constipation etc

## 2024-04-25 NOTE — DISCUSSION/SUMMARY
[FreeTextEntry1] : [] PNE leads are successfully removed with colored markers attached at the tip. No bleeding at the site and erythema noted.  [] Considering, pt noticed difference only same day, we offered her advance trail before full implant to make sure pt is best candidate for this procedure. Pt is agreeable to this plan. Message sent to Yesica to call pt to schedule for advance trial with full implant date.  [] Pessary Rs#2 with knob was removed in the office. Pt doesn't want to continue with pessary.  [] Voided urine sample is sent to the lab for UA and culture to rule out UTI.  If she has evidence of urinary tract infection, will consider treating with nitrofurantoin 100 mg p.o. twice daily for 7 days or Keflex 500 mg p.o. twice daily for 7 days. [] Follow up as needed. [] Instructed to call with any questions or concerns and she verbalizes understanding.

## 2024-04-25 NOTE — ADDENDUM
[FreeTextEntry1] : Patient seen and examined with DICK Beltran.  Patient had excellent response to PNA within the first 12 to 20 hours.  She had no urgency no leakage however however during sleep she felt that she might have dislodged the PNE leads .  Her nocturia was not improved.  In addition she has known sleep apnea with some struggle with the CPAP mask.  She has not noticed any improvement in her bladder leakage with use of pessary.  I discussed the options of advanced trial of Axonics therapy.  She agrees.  I sent over booking sheet.  She will book the procedure when it is logistically feasible for her.  The two-step nature of the process of placement of neurostimulator, potential risks etc. were reviewed.  She is on a very weight loss program.  She has so far lost 35 pounds with use of Mounjaro 2.5 mg.  Losing weight is expected to help her with urine incontinence as well as with her sleep apnea.  She verbalized understanding.  I have submitted over booking sheet

## 2024-04-26 LAB
APPEARANCE: CLEAR
BACTERIA: NEGATIVE /HPF
BILIRUBIN URINE: NEGATIVE
BLOOD URINE: NEGATIVE
CAST: 0 /LPF
COLOR: YELLOW
EPITHELIAL CELLS: 0 /HPF
GLUCOSE QUALITATIVE U: NEGATIVE MG/DL
KETONES URINE: NEGATIVE MG/DL
LEUKOCYTE ESTERASE URINE: ABNORMAL
MICROSCOPIC-UA: NORMAL
NITRITE URINE: NEGATIVE
PH URINE: 7.5
PROTEIN URINE: NEGATIVE MG/DL
RED BLOOD CELLS URINE: 4 /HPF
SPECIFIC GRAVITY URINE: 1.01
UROBILINOGEN URINE: 0.2 MG/DL
WHITE BLOOD CELLS URINE: 0 /HPF

## 2024-04-29 RX ORDER — AMOXICILLIN AND CLAVULANATE POTASSIUM 875; 125 MG/1; MG/1
875-125 TABLET, COATED ORAL TWICE DAILY
Qty: 14 | Refills: 0 | Status: ACTIVE | COMMUNITY
Start: 2024-04-29 | End: 1900-01-01

## 2024-06-18 ENCOUNTER — APPOINTMENT (OUTPATIENT)
Dept: UROGYNECOLOGY | Facility: HOSPITAL | Age: 74
End: 2024-06-18

## 2024-06-25 ENCOUNTER — NON-APPOINTMENT (OUTPATIENT)
Age: 74
End: 2024-06-25

## 2024-06-25 ENCOUNTER — APPOINTMENT (OUTPATIENT)
Dept: OPHTHALMOLOGY | Facility: CLINIC | Age: 74
End: 2024-06-25
Payer: COMMERCIAL

## 2024-06-25 PROCEDURE — 92004 COMPRE OPH EXAM NEW PT 1/>: CPT

## 2024-07-05 ENCOUNTER — APPOINTMENT (OUTPATIENT)
Dept: UROGYNECOLOGY | Facility: HOSPITAL | Age: 74
End: 2024-07-05

## 2024-07-12 ENCOUNTER — APPOINTMENT (OUTPATIENT)
Dept: UROGYNECOLOGY | Facility: CLINIC | Age: 74
End: 2024-07-12

## 2024-10-18 ENCOUNTER — APPOINTMENT (OUTPATIENT)
Dept: OPHTHALMOLOGY | Facility: CLINIC | Age: 74
End: 2024-10-18
Payer: MEDICARE

## 2024-10-18 ENCOUNTER — NON-APPOINTMENT (OUTPATIENT)
Age: 74
End: 2024-10-18

## 2024-10-18 PROCEDURE — 92014 COMPRE OPH EXAM EST PT 1/>: CPT

## 2024-10-18 PROCEDURE — 92133 CPTRZD OPH DX IMG PST SGM ON: CPT

## 2025-03-27 ENCOUNTER — APPOINTMENT (OUTPATIENT)
Dept: UROGYNECOLOGY | Facility: CLINIC | Age: 75
End: 2025-03-27
Payer: MEDICARE

## 2025-03-27 DIAGNOSIS — R35.0 FREQUENCY OF MICTURITION: ICD-10-CM

## 2025-03-27 DIAGNOSIS — N39.46 MIXED INCONTINENCE: ICD-10-CM

## 2025-03-27 DIAGNOSIS — R35.1 NOCTURIA: ICD-10-CM

## 2025-03-27 DIAGNOSIS — N32.81 OVERACTIVE BLADDER: ICD-10-CM

## 2025-03-27 PROCEDURE — 99213 OFFICE O/P EST LOW 20 MIN: CPT

## 2025-03-27 RX ORDER — SOLIFENACIN SUCCINATE 10 MG/1
10 TABLET ORAL
Qty: 90 | Refills: 1 | Status: ACTIVE | COMMUNITY
Start: 2025-03-27 | End: 1900-01-01

## 2025-03-28 LAB
APPEARANCE: CLEAR
BACTERIA: NEGATIVE /HPF
BILIRUBIN URINE: NEGATIVE
BLOOD URINE: NEGATIVE
CAST: 0 /LPF
COLOR: NORMAL
EPITHELIAL CELLS: 1 /HPF
GLUCOSE QUALITATIVE U: NEGATIVE MG/DL
KETONES URINE: NEGATIVE MG/DL
LEUKOCYTE ESTERASE URINE: NEGATIVE
MICROSCOPIC-UA: NORMAL
NITRITE URINE: NEGATIVE
PH URINE: 7.5
PROTEIN URINE: NEGATIVE MG/DL
RED BLOOD CELLS URINE: 0 /HPF
SPECIFIC GRAVITY URINE: 1.01
UROBILINOGEN URINE: 0.2 MG/DL
WHITE BLOOD CELLS URINE: 0 /HPF

## 2025-04-01 RX ORDER — CEFPODOXIME PROXETIL 200 MG/1
200 TABLET, FILM COATED ORAL
Qty: 14 | Refills: 0 | Status: ACTIVE | COMMUNITY
Start: 2025-04-01 | End: 1900-01-01

## 2025-06-12 ENCOUNTER — RESULT CHARGE (OUTPATIENT)
Age: 75
End: 2025-06-12

## 2025-06-12 ENCOUNTER — APPOINTMENT (OUTPATIENT)
Dept: UROGYNECOLOGY | Facility: CLINIC | Age: 75
End: 2025-06-12

## 2025-06-12 VITALS — BODY MASS INDEX: 40.09 KG/M2 | WEIGHT: 280 LBS | HEIGHT: 70 IN

## 2025-06-12 VITALS — DIASTOLIC BLOOD PRESSURE: 80 MMHG | SYSTOLIC BLOOD PRESSURE: 136 MMHG

## 2025-06-12 PROBLEM — N95.2 ATROPHIC VAGINITIS: Status: ACTIVE | Noted: 2025-06-12

## 2025-06-12 PROCEDURE — 99214 OFFICE O/P EST MOD 30 MIN: CPT

## 2025-06-12 PROCEDURE — 51798 US URINE CAPACITY MEASURE: CPT

## 2025-06-12 RX ORDER — MIRABEGRON 50 MG/1
50 TABLET, EXTENDED RELEASE ORAL
Qty: 30 | Refills: 3 | Status: ACTIVE | COMMUNITY
Start: 2025-06-12 | End: 1900-01-01

## 2025-06-12 RX ORDER — TROSPIUM CHLORIDE 60 MG/1
60 CAPSULE, EXTENDED RELEASE ORAL
Qty: 90 | Refills: 1 | Status: DISCONTINUED | COMMUNITY
Start: 2025-06-12 | End: 2025-06-12

## 2025-06-12 RX ORDER — ESTRADIOL 0.1 MG/G
0.1 CREAM VAGINAL
Qty: 1 | Refills: 3 | Status: ACTIVE | COMMUNITY
Start: 2025-06-12 | End: 1900-01-01

## 2025-06-13 LAB
APPEARANCE: CLEAR
BACTERIA: NEGATIVE /HPF
BILIRUBIN URINE: NEGATIVE
BLOOD URINE: NEGATIVE
CAST: 0 /LPF
COLOR: YELLOW
EPITHELIAL CELLS: 1 /HPF
GLUCOSE QUALITATIVE U: NEGATIVE MG/DL
KETONES URINE: NEGATIVE MG/DL
LEUKOCYTE ESTERASE URINE: NEGATIVE
MICROSCOPIC-UA: NORMAL
NITRITE URINE: NEGATIVE
PH URINE: 7.5
PROTEIN URINE: NEGATIVE MG/DL
RED BLOOD CELLS URINE: 0 /HPF
SPECIFIC GRAVITY URINE: 1.01
UROBILINOGEN URINE: 0.2 MG/DL
WHITE BLOOD CELLS URINE: 0 /HPF

## 2025-06-16 LAB — BACTERIA UR CULT: NORMAL

## 2025-07-18 ENCOUNTER — APPOINTMENT (OUTPATIENT)
Dept: OPHTHALMOLOGY | Facility: CLINIC | Age: 75
End: 2025-07-18

## 2025-08-18 ENCOUNTER — APPOINTMENT (OUTPATIENT)
Dept: OPHTHALMOLOGY | Facility: CLINIC | Age: 75
End: 2025-08-18
Payer: MEDICARE

## 2025-08-18 ENCOUNTER — APPOINTMENT (OUTPATIENT)
Dept: OPHTHALMOLOGY | Facility: CLINIC | Age: 75
End: 2025-08-18

## 2025-08-18 ENCOUNTER — NON-APPOINTMENT (OUTPATIENT)
Age: 75
End: 2025-08-18

## 2025-08-18 PROCEDURE — 92014 COMPRE OPH EXAM EST PT 1/>: CPT

## 2025-08-18 PROCEDURE — 92133 CPTRZD OPH DX IMG PST SGM ON: CPT

## 2025-08-18 PROCEDURE — 92083 EXTENDED VISUAL FIELD XM: CPT

## 2025-09-18 ENCOUNTER — APPOINTMENT (OUTPATIENT)
Dept: UROGYNECOLOGY | Facility: CLINIC | Age: 75
End: 2025-09-18

## 2025-09-18 DIAGNOSIS — R39.15 URGENCY OF URINATION: ICD-10-CM

## 2025-09-18 DIAGNOSIS — N39.41 URGE INCONTINENCE: ICD-10-CM

## 2025-09-18 DIAGNOSIS — R35.0 FREQUENCY OF MICTURITION: ICD-10-CM

## 2025-09-18 DIAGNOSIS — N95.2 POSTMENOPAUSAL ATROPHIC VAGINITIS: ICD-10-CM

## 2025-09-18 DIAGNOSIS — N32.81 OVERACTIVE BLADDER: ICD-10-CM

## 2025-09-18 DIAGNOSIS — R35.1 NOCTURIA: ICD-10-CM
